# Patient Record
Sex: MALE | Race: WHITE | Employment: OTHER | URBAN - METROPOLITAN AREA
[De-identification: names, ages, dates, MRNs, and addresses within clinical notes are randomized per-mention and may not be internally consistent; named-entity substitution may affect disease eponyms.]

---

## 2021-07-29 ENCOUNTER — NEW REFERRAL (OUTPATIENT)
Dept: URBAN - METROPOLITAN AREA CLINIC 33 | Facility: CLINIC | Age: 71
End: 2021-07-29

## 2021-07-29 VITALS
HEART RATE: 75 BPM | SYSTOLIC BLOOD PRESSURE: 142 MMHG | BODY MASS INDEX: 30.36 KG/M2 | HEIGHT: 69 IN | WEIGHT: 205 LBS | DIASTOLIC BLOOD PRESSURE: 98 MMHG

## 2021-07-29 DIAGNOSIS — H43.811: ICD-10-CM

## 2021-07-29 DIAGNOSIS — H43.391: ICD-10-CM

## 2021-07-29 DIAGNOSIS — H35.372: ICD-10-CM

## 2021-07-29 DIAGNOSIS — H35.363: ICD-10-CM

## 2021-07-29 PROCEDURE — 92004 COMPRE OPH EXAM NEW PT 1/>: CPT

## 2021-07-29 PROCEDURE — 92250 FUNDUS PHOTOGRAPHY W/I&R: CPT

## 2021-07-29 ASSESSMENT — TONOMETRY
OD_IOP_MMHG: 10
OS_IOP_MMHG: 09

## 2021-07-29 ASSESSMENT — VISUAL ACUITY
OS_SC: 20/20
OD_SC: 20/20-1

## 2021-09-07 ENCOUNTER — FOLLOW UP (OUTPATIENT)
Dept: URBAN - METROPOLITAN AREA CLINIC 33 | Facility: CLINIC | Age: 71
End: 2021-09-07

## 2021-09-07 DIAGNOSIS — H43.811: ICD-10-CM

## 2021-09-07 DIAGNOSIS — H35.363: ICD-10-CM

## 2021-09-07 DIAGNOSIS — H35.372: ICD-10-CM

## 2021-09-07 DIAGNOSIS — H43.391: ICD-10-CM

## 2021-09-07 DIAGNOSIS — H04.123: ICD-10-CM

## 2021-09-07 PROCEDURE — 92012 INTRM OPH EXAM EST PATIENT: CPT

## 2021-09-07 PROCEDURE — 92134 CPTRZ OPH DX IMG PST SGM RTA: CPT

## 2021-09-07 PROCEDURE — 92250 FUNDUS PHOTOGRAPHY W/I&R: CPT

## 2021-09-07 ASSESSMENT — VISUAL ACUITY
OD_SC: 20/20
OS_SC: 20/20

## 2021-09-07 ASSESSMENT — TONOMETRY
OS_IOP_MMHG: 14
OD_IOP_MMHG: 17

## 2022-09-12 ENCOUNTER — COMPREHENSIVE EXAM (OUTPATIENT)
Dept: URBAN - METROPOLITAN AREA CLINIC 26 | Facility: CLINIC | Age: 72
End: 2022-09-12

## 2022-09-12 VITALS — HEIGHT: 70 IN | BODY MASS INDEX: 31.5 KG/M2 | WEIGHT: 220 LBS

## 2022-09-12 DIAGNOSIS — H43.391: ICD-10-CM

## 2022-09-12 DIAGNOSIS — H35.363: ICD-10-CM

## 2022-09-12 DIAGNOSIS — H35.372: ICD-10-CM

## 2022-09-12 DIAGNOSIS — H35.342: ICD-10-CM

## 2022-09-12 DIAGNOSIS — H04.123: ICD-10-CM

## 2022-09-12 DIAGNOSIS — H43.811: ICD-10-CM

## 2022-09-12 PROCEDURE — 92134 CPTRZ OPH DX IMG PST SGM RTA: CPT

## 2022-09-12 PROCEDURE — 92014 COMPRE OPH EXAM EST PT 1/>: CPT

## 2022-09-12 ASSESSMENT — VISUAL ACUITY
OS_SC: 20/20-1
OD_SC: 20/20-1

## 2022-09-12 ASSESSMENT — TONOMETRY
OS_IOP_MMHG: 11
OD_IOP_MMHG: 11

## 2023-09-12 ENCOUNTER — FOLLOW UP (OUTPATIENT)
Dept: URBAN - METROPOLITAN AREA CLINIC 26 | Facility: CLINIC | Age: 73
End: 2023-09-12

## 2023-09-12 VITALS — WEIGHT: 230 LBS | HEIGHT: 70 IN | BODY MASS INDEX: 32.93 KG/M2

## 2023-09-12 DIAGNOSIS — H43.813: ICD-10-CM

## 2023-09-12 DIAGNOSIS — H43.391: ICD-10-CM

## 2023-09-12 DIAGNOSIS — H35.342: ICD-10-CM

## 2023-09-12 DIAGNOSIS — H35.363: ICD-10-CM

## 2023-09-12 DIAGNOSIS — H04.123: ICD-10-CM

## 2023-09-12 DIAGNOSIS — H35.372: ICD-10-CM

## 2023-09-12 PROCEDURE — 92134 CPTRZ OPH DX IMG PST SGM RTA: CPT

## 2023-09-12 PROCEDURE — 92250 FUNDUS PHOTOGRAPHY W/I&R: CPT

## 2023-09-12 PROCEDURE — 92014 COMPRE OPH EXAM EST PT 1/>: CPT

## 2023-09-12 ASSESSMENT — VISUAL ACUITY
OS_SC: 20/20
OD_SC: 20/20-2

## 2023-09-12 ASSESSMENT — TONOMETRY
OD_IOP_MMHG: 12
OS_IOP_MMHG: 14